# Patient Record
Sex: FEMALE | Race: WHITE | NOT HISPANIC OR LATINO | Employment: FULL TIME | URBAN - METROPOLITAN AREA
[De-identification: names, ages, dates, MRNs, and addresses within clinical notes are randomized per-mention and may not be internally consistent; named-entity substitution may affect disease eponyms.]

---

## 2021-06-01 DIAGNOSIS — B00.9 HSV-2 INFECTION: Primary | ICD-10-CM

## 2021-06-01 RX ORDER — VALACYCLOVIR HYDROCHLORIDE 500 MG/1
500 TABLET, FILM COATED ORAL DAILY
Qty: 90 TABLET | Refills: 4 | Status: SHIPPED | OUTPATIENT
Start: 2021-06-01 | End: 2022-03-31

## 2021-06-01 RX ORDER — VALACYCLOVIR HYDROCHLORIDE 500 MG/1
500 TABLET, FILM COATED ORAL DAILY
COMMUNITY
End: 2021-06-01 | Stop reason: SDUPTHER

## 2021-06-01 NOTE — TELEPHONE ENCOUNTER
Patient called requesting refill of her Valtrex 500 mg which she take daily (suppressive therapy) to [facility]  She was seen by Dr Noe Wagner 02/11/2021 but no rx was transmitted  Advised pt that Dr Noe Wagner is out of the office today but will forward her rx request to on-call provider Dr Trev Horner to address refills as she does need it sent as soon as possible  Dr Trev Horner please address

## 2022-03-24 ENCOUNTER — VBI (OUTPATIENT)
Dept: ADMINISTRATIVE | Facility: OTHER | Age: 52
End: 2022-03-24

## 2022-03-24 NOTE — TELEPHONE ENCOUNTER
Upon review of the In Basket request we were able to locate, review, and update the patient chart as requested for Pap Smear (HPV) aka Cervical Cancer Screening  Any additional questions or concerns should be emailed to the Practice Liaisons via [email or web address] email, please do not reply via In Basket      Thank you  Gabbie Jerez

## 2022-03-31 ENCOUNTER — ANNUAL EXAM (OUTPATIENT)
Dept: OBGYN CLINIC | Facility: CLINIC | Age: 52
End: 2022-03-31
Payer: COMMERCIAL

## 2022-03-31 VITALS
DIASTOLIC BLOOD PRESSURE: 84 MMHG | HEIGHT: 64 IN | SYSTOLIC BLOOD PRESSURE: 144 MMHG | BODY MASS INDEX: 49.34 KG/M2 | WEIGHT: 289 LBS

## 2022-03-31 DIAGNOSIS — N92.6 IRREGULAR MENSES: ICD-10-CM

## 2022-03-31 DIAGNOSIS — Z01.411 ENCNTR FOR GYN EXAM (GENERAL) (ROUTINE) W ABNORMAL FINDINGS: Primary | ICD-10-CM

## 2022-03-31 DIAGNOSIS — Z12.4 SCREENING FOR CERVICAL CANCER: ICD-10-CM

## 2022-03-31 DIAGNOSIS — Z12.31 ENCOUNTER FOR SCREENING MAMMOGRAM FOR MALIGNANT NEOPLASM OF BREAST: ICD-10-CM

## 2022-03-31 DIAGNOSIS — Z87.42 HISTORY OF ABNORMAL CERVICAL PAP SMEAR: ICD-10-CM

## 2022-03-31 DIAGNOSIS — Z11.3 SCREENING FOR STD (SEXUALLY TRANSMITTED DISEASE): ICD-10-CM

## 2022-03-31 PROCEDURE — S0612 ANNUAL GYNECOLOGICAL EXAMINA: HCPCS | Performed by: OBSTETRICS & GYNECOLOGY

## 2022-03-31 NOTE — PROGRESS NOTES
Annual Wellness Visit  [address], [address], [facility], [address] [facility]    ASSESSMENT/PLAN: Robe Peng is a 54 y o  Luiza Hong who presents for annual gynecologic exam     Encounter for routine gynecologic examination  - Routine well woman exam completed today  - Cervical Cancer Screening: Current ASCCP Guidelines reviewed  Last Pap: 02/11/2021  Next Pap Due: 2026, routine   - STI screening offered including HIV testing: GC/CT and blood work ordered  - Contraceptive counseling discussed  Current contraception: no method  - Breast Cancer Screening: Last Mammogram 02/17/2022  - The following were reviewed in today's visit: breast self exam    Additional problems addressed during this visit:  1  Encntr for gyn exam (general) (routine) w abnormal findings  -     IGP,CtNg,AptimaHPV,rfx16/18,45    2  Encounter for screening mammogram for malignant neoplasm of breast  -     Mammo screening bilateral w 3d & cad; Future    3  Screening for cervical cancer  -     IGP, Aptima HPV, Rfx 16/18,45  -     IGP,CtNg,AptimaHPV,rfx16/18,45    4  Irregular menses       -  Si/sx of menopause reviewed with patient  Advised patient to inform Gyn if vaginal bleeding occurs <21 days apart or lasts>7days  Inform Gyn if vaginal bleeding occurs after no menses for 1 year    5  Screening for STD (sexually transmitted disease)  -     Hepatitis B core antibody, total; Future  -     Hepatitis B surface antigen; Future  -     Hepatitis C antibody; Future  -     HIV 1/2 Antigen/Antibody (4th Generation) w Reflex [facility]; Future  -     RPR; Future    6  History of abnormal cervical Pap smear       -  Patient Pap significant for ASCUS, neg HR HPV 2019 and 2021  Next visit:  1 yr    CC:  Annual Gynecologic Examination    HPI: Robe Peng is a 54 y o  Luiza Hong who presents for annual gynecologic examination  Patient presents for Gyn exam   Desires STD testing, including HIV    Has history of Herpes and Chlamydia in the past   Patient states her menstrual cycles were q 4 weeks but now occur q 3 months, desires to discuss si/sx of menopause      Gyn History  Patient's last menstrual period was 2022  Last Pap: 2021 was normal    She  reports being sexually active and has had partner(s) who are male  OB History      Past Medical History:  No date: Chlamydia  No date: Herpes  No date: Morbid obesity ([address] )  No date: Mucous polyp of cervix     Past Surgical History:  : BARIATRIC SURGERY  No date: MAMMO (HISTORICAL)  2014: OTHER SURGICAL HISTORY      Comment:  lap band removed     History reviewed  No pertinent family history  Social History     Tobacco Use    Smoking status: Never Smoker    Smokeless tobacco: Never Used   Vaping Use    Vaping Use: Never used   Substance Use Topics    Alcohol use: Yes    Drug use: Never     Comment: no          Current Outpatient Medications:     valACYclovir (VALTREX) 500 mg tablet, Take 1 tablet (500 mg total) by mouth daily 1 tablet daily, Disp: 90 tablet, Rfl: 4    She has No Known Allergies       ROS negative except as noted in HPI    Objective:  /84 (BP Location: Left arm, Patient Position: Sitting, Cuff Size: Standard)   Ht 5' 4" (1 626 m)   Wt 131 kg (289 lb)   LMP 2022   BMI 49 61 kg/m²      Physical Exam  Vitals and nursing note reviewed  HENT:      Head: Normocephalic  Chest:   Breasts: Breasts are symmetrical       Right: Normal  No bleeding, mass, nipple discharge, skin change, tenderness or axillary adenopathy  Left: Normal  No bleeding, mass, nipple discharge, skin change, tenderness or axillary adenopathy  Abdominal:      General: There is no distension  Palpations: Abdomen is soft  There is no mass  Tenderness: There is no abdominal tenderness  There is no rebound  Genitourinary:     General: Normal vulva  Exam position: Lithotomy position        Labia:         Right: No rash, tenderness or lesion  Left: No rash, tenderness or lesion  Urethra: No urethral pain or urethral lesion  Vagina: Normal  No vaginal discharge  Cervix: No discharge, friability, lesion or erythema  Uterus: Normal        Adnexa: Right adnexa normal and left adnexa normal       Rectum: No external hemorrhoid  Musculoskeletal:      Right lower leg: No edema  Left lower leg: No edema  Lymphadenopathy:      Upper Body:      Right upper body: No axillary or pectoral adenopathy  Left upper body: No axillary or pectoral adenopathy  Skin:     General: Skin is warm  Neurological:      Mental Status: She is alert and oriented to person, place, and time  Psychiatric:         Mood and Affect: Mood normal          Behavior: Behavior normal          Thought Content:  Thought content normal

## 2022-04-06 LAB
C TRACH RRNA CVX QL NAA+PROBE: NEGATIVE
CYTOLOGIST CVX/VAG CYTO: NORMAL
DX ICD CODE: NORMAL
HPV I/H RISK 4 DNA CVX QL PROBE+SIG AMP: NEGATIVE
Lab: NORMAL
N GONORRHOEA RRNA CVX QL NAA+PROBE: NEGATIVE
OTHER STN SPEC: NORMAL
PATH REPORT.FINAL DX SPEC: NORMAL
SL AMB NOTE:: NORMAL
SL AMB SPECIMEN ADEQUACY: NORMAL
SL AMB TEST METHODOLOGY: NORMAL

## 2022-08-31 ENCOUNTER — TELEPHONE (OUTPATIENT)
Dept: OBGYN CLINIC | Facility: CLINIC | Age: 52
End: 2022-08-31

## 2022-08-31 DIAGNOSIS — B00.9 HSV-2 INFECTION: ICD-10-CM

## 2022-08-31 RX ORDER — VALACYCLOVIR HYDROCHLORIDE 500 MG/1
500 TABLET, FILM COATED ORAL DAILY
Qty: 90 TABLET | Refills: 4 | Status: SHIPPED | OUTPATIENT
Start: 2022-08-31 | End: 2022-11-29

## 2022-08-31 NOTE — TELEPHONE ENCOUNTER
Received a call from Stacie Chong at [facility] mail away pharmacy in [address] [place] [address] patient is attempting to transfer her Valtrex Rx but it has  and will need a new order e-prescribed  Spoke with pt, who informed  she has had a recent change in insurance, will no longer be using [facility] and and will  need to use [facility] mail away pharmacy  Pt reports she takes Valtrex daily for suppression therapy  Last WA: 3/31/2022  Order pended, please review and send  - - -

## 2023-06-07 ENCOUNTER — ANNUAL EXAM (OUTPATIENT)
Dept: OBGYN CLINIC | Facility: CLINIC | Age: 53
End: 2023-06-07
Payer: COMMERCIAL

## 2023-06-07 VITALS
SYSTOLIC BLOOD PRESSURE: 130 MMHG | WEIGHT: 281.6 LBS | DIASTOLIC BLOOD PRESSURE: 88 MMHG | HEIGHT: 64 IN | BODY MASS INDEX: 48.07 KG/M2

## 2023-06-07 DIAGNOSIS — Z12.4 SCREENING FOR CERVICAL CANCER: ICD-10-CM

## 2023-06-07 DIAGNOSIS — Z12.31 BREAST CANCER SCREENING BY MAMMOGRAM: ICD-10-CM

## 2023-06-07 DIAGNOSIS — Z01.419 ENCNTR FOR GYN EXAM (GENERAL) (ROUTINE) W/O ABN FINDINGS: Primary | ICD-10-CM

## 2023-06-07 DIAGNOSIS — Z11.3 SCREENING FOR STD (SEXUALLY TRANSMITTED DISEASE): ICD-10-CM

## 2023-06-07 DIAGNOSIS — Z87.42 HISTORY OF ABNORMAL CERVICAL PAP SMEAR: ICD-10-CM

## 2023-06-07 DIAGNOSIS — E66.01 MORBID OBESITY (HCC): ICD-10-CM

## 2023-06-07 PROCEDURE — S0612 ANNUAL GYNECOLOGICAL EXAMINA: HCPCS | Performed by: OBSTETRICS & GYNECOLOGY

## 2023-06-07 RX ORDER — VALACYCLOVIR HYDROCHLORIDE 500 MG/1
500 TABLET, FILM COATED ORAL
COMMUNITY

## 2023-06-07 RX ORDER — AMLODIPINE BESYLATE 5 MG/1
5 TABLET ORAL DAILY
COMMUNITY
Start: 2023-05-02

## 2023-06-07 NOTE — PROGRESS NOTES
Annual Wellness Visit  [address]   [address], [address], [facility], [facility]    ASSESSMENT/PLAN: Baltazar Clayton is a 50 y o  Robin Cartagena who presents for annual gynecologic exam     Encounter for routine gynecologic examination  - Routine well woman exam completed today  - Cervical Cancer Screening: Current ASCCP Guidelines reviewed  Last Pap: 2022  Next Pap Due: today, routine   - Contraceptive counseling discussed  Current contraception: no method  - Breast Cancer Screening: Last Mammogram 2022  - The following were reviewed in today's visit: breast self exam    Additional problems addressed during this visit:  1  Encntr for gyn exam (general) (routine) w/o abn findings  -     IGP,CtNg,AptimaHPV,rfx16/18,45    2  Breast cancer screening by mammogram  -     Mammo screening bilateral w 3d & cad; Future    3  Screening for cervical cancer  -     IGP,CtNg,AptimaHPV,rfx16/18,45    4  Screening for STD (sexually transmitted disease)  -     Hepatitis C antibody; Future  -     Hepatitis B core antibody, total; Future  -     Hepatitis C antibody  -     Hepatitis B core antibody, total  -     HIV 1/2 AG/AB W REFLEX [facility] only; Future  -     HIV 1/2 AG/AB W REFLEX [facility] only    5  History of abnormal cervical Pap smear    6  Morbid obesity ([address] )        Next visit: 1 yr      CC:  Annual Gynecologic Examination    HPI: Baltazar Clayton is a 50 y o  Robin Cartagena who presents for annual gynecologic examination  Patient presents for Gyn exam   Desires STD screening with CG/CT and lab order      Gyn History  Patient's last menstrual period was 2023  Last Pap: 2022 was normal    She  reports being sexually active and has had partner(s) who are male  She reports using the following method of birth control/protection: None  OB History      Past Medical History:  No date: Chlamydia  No date: Herpes  No date: Hypertension  No date:  Morbid obesity "(HCC)  No date: Mucous polyp of cervix     Past Surgical History:  2010: BARIATRIC SURGERY  No date: MAMMO (HISTORICAL)  2014: OTHER SURGICAL HISTORY      Comment:  lap band removed     Family History   Problem Relation Age of Onset   • Cancer Mother    • Breast cancer Neg Hx    • Uterine cancer Neg Hx    • Ovarian cancer Neg Hx    • Colon cancer Neg Hx         Social History     Tobacco Use   • Smoking status: Never   • Smokeless tobacco: Never   Vaping Use   • Vaping Use: Never used   Substance Use Topics   • Alcohol use: Yes     Alcohol/week: 6 0 standard drinks of alcohol     Types: 6 Shots of liquor per week     Comment: social   • Drug use: Never     Comment: no          Current Outpatient Medications:   •  amLODIPine (NORVASC) 5 mg tablet, Take 5 mg by mouth daily, Disp: , Rfl:   •  valACYclovir (VALTREX) 500 mg tablet, Take 500 mg by mouth Daily at 2am, Disp: , Rfl:   •  valACYclovir (VALTREX) 500 mg tablet, Take 1 tablet (500 mg total) by mouth daily 1 tablet daily, Disp: 90 tablet, Rfl: 4    She has No Known Allergies       ROS negative except as noted in HPI    Objective:  /88   Ht 5' 3 5\" (1 613 m)   Wt 128 kg (281 lb 9 6 oz)   LMP 04/24/2023 Comment: Sporadic menses  BMI 49 10 kg/m²      Physical Exam  Vitals and nursing note reviewed  HENT:      Head: Normocephalic  Chest:   Breasts:     Breasts are symmetrical       Right: Normal  No bleeding, mass, nipple discharge, skin change or tenderness  Left: Normal  No bleeding, mass, nipple discharge, skin change or tenderness  Abdominal:      General: There is no distension  Palpations: Abdomen is soft  There is no mass  Tenderness: There is no abdominal tenderness  There is no rebound  Genitourinary:     General: Normal vulva  Exam position: Lithotomy position  Labia:         Right: No rash, tenderness or lesion  Left: No rash, tenderness or lesion  Urethra: No urethral pain or urethral lesion        " Vagina: Normal  No vaginal discharge  Cervix: No discharge, friability, lesion or erythema  Uterus: Normal        Adnexa: Right adnexa normal and left adnexa normal       Rectum: No external hemorrhoid  Musculoskeletal:      Right lower leg: No edema  Left lower leg: No edema  Lymphadenopathy:      Upper Body:      Right upper body: No axillary or pectoral adenopathy  Left upper body: No axillary or pectoral adenopathy  Skin:     General: Skin is warm  Neurological:      Mental Status: She is alert and oriented to person, place, and time  Psychiatric:         Mood and Affect: Mood normal          Behavior: Behavior normal          Thought Content:  Thought content normal

## 2023-06-10 LAB
C TRACH RRNA CVX QL NAA+PROBE: NEGATIVE
CYTOLOGIST CVX/VAG CYTO: NORMAL
DX ICD CODE: NORMAL
HPV GENOTYPE REFLEX: NORMAL
HPV I/H RISK 4 DNA CVX QL PROBE+SIG AMP: NEGATIVE
N GONORRHOEA RRNA CVX QL NAA+PROBE: NEGATIVE
OTHER STN SPEC: NORMAL
PATH REPORT.FINAL DX SPEC: NORMAL
SL AMB NOTE:: NORMAL
SL AMB SPECIMEN ADEQUACY: NORMAL
SL AMB TEST METHODOLOGY: NORMAL

## 2023-10-20 DIAGNOSIS — B00.9 HSV-2 INFECTION: ICD-10-CM

## 2023-10-22 RX ORDER — VALACYCLOVIR HYDROCHLORIDE 500 MG/1
500 TABLET, FILM COATED ORAL DAILY
Qty: 90 TABLET | Refills: 3 | Status: SHIPPED | OUTPATIENT
Start: 2023-10-22 | End: 2024-10-16

## 2024-10-31 ENCOUNTER — ANNUAL EXAM (OUTPATIENT)
Dept: OBGYN CLINIC | Facility: CLINIC | Age: 54
End: 2024-10-31
Payer: COMMERCIAL

## 2024-10-31 VITALS — WEIGHT: 293 LBS | HEIGHT: 64 IN | BODY MASS INDEX: 50.02 KG/M2

## 2024-10-31 DIAGNOSIS — N95.9 POSTMENOPAUSAL SYMPTOMS: ICD-10-CM

## 2024-10-31 DIAGNOSIS — Z01.411 ENCNTR FOR GYN EXAM (GENERAL) (ROUTINE) W ABNORMAL FINDINGS: Primary | ICD-10-CM

## 2024-10-31 DIAGNOSIS — Z12.31 BREAST CANCER SCREENING BY MAMMOGRAM: ICD-10-CM

## 2024-10-31 DIAGNOSIS — G47.00 INSOMNIA, UNSPECIFIED TYPE: ICD-10-CM

## 2024-10-31 DIAGNOSIS — Z11.3 SCREEN FOR STD (SEXUALLY TRANSMITTED DISEASE): ICD-10-CM

## 2024-10-31 PROCEDURE — S0612 ANNUAL GYNECOLOGICAL EXAMINA: HCPCS | Performed by: OBSTETRICS & GYNECOLOGY

## 2024-10-31 PROCEDURE — 99214 OFFICE O/P EST MOD 30 MIN: CPT | Performed by: OBSTETRICS & GYNECOLOGY

## 2024-10-31 RX ORDER — OLMESARTAN MEDOXOMIL 5 MG/1
TABLET ORAL
COMMUNITY
Start: 2024-01-01

## 2024-11-05 LAB
C TRACH RRNA SPEC QL NAA+PROBE: NEGATIVE
N GONORRHOEA RRNA SPEC QL NAA+PROBE: NEGATIVE

## 2024-11-06 DIAGNOSIS — B00.9 HSV-2 INFECTION: Primary | ICD-10-CM

## 2024-11-06 RX ORDER — VALACYCLOVIR HYDROCHLORIDE 500 MG/1
500 TABLET, FILM COATED ORAL DAILY
Qty: 90 TABLET | Refills: 3 | Status: SHIPPED | OUTPATIENT
Start: 2024-11-06 | End: 2025-11-01

## 2024-12-09 NOTE — PROGRESS NOTES
"Annual Wellness Visit  [facility] - [address], [address], [place] [address]    ASSESSMENT/PLAN: Cinthia Ortiz is a 54 y.o.  who presents for annual gynecologic exam.    Assessment & Plan  Breast cancer screening by mammogram    Orders:    Mammo screening bilateral w 3d and cad; Future    Screen for STD (sexually transmitted disease)    Orders:    Chlamydia/GC EDIN, Confirmation    Encntr for gyn exam (general) (routine) w abnormal findings         Postmenopausal symptoms     -  Si/sx of menopause reviewed with patient.  Management with HRT or SSRI reviewed, including R&B and indications      -  pt declines medical treatment options        Insomnia, unspecified type       -  management with SSRI, intermittent use of Benadryl reviewed.  Encouraged patient to discuss further with PCP       BMI 50.0-59.9, adult (HCC)      -  Healthy diet, exercise reviewed.     I have spent a total time of 50 minutes (30 minutes were spent addressing concerns not related to \"Gyn wellness\" visit) in caring for this patient on the day of the visit/encounter including Diagnostic results, Prognosis, Risks and benefits of tx options, Instructions for management, Patient and family education, Importance of tx compliance, Risk factor reductions, Impressions, Counseling / Coordination of care, Documenting in the medical record, Reviewing / ordering tests, medicine, procedures  , and Obtaining or reviewing history  .     Next visit: 1 yr [place]      CC:  Annual Gynecologic Examination    HPI: Cinthia Ortiz is a 54 y.o.  who presents for annual gynecologic examination.  Patient presents for Gyn wellness exam.  Also desires to discuss symptoms related to monopause.  Desires to be screened for GC/CT.  Patient also concerned about insomnia    Gynecologic Exam        Gyn History  Patient's last menstrual period was 2023.     Last Pap: 2023 was normal    She  reports being sexually active and has had " "partner(s) who are male. She reports using the following method of birth control/protection: None.       OB History      Past Medical History:  No date: Chlamydia  No date: Herpes  No date: Hypertension  No date: Morbid obesity (HCC)  No date: Mucous polyp of cervix     Past Surgical History:  : BARIATRIC SURGERY  No date: MAMMO (HISTORICAL)  2014: OTHER SURGICAL HISTORY      Comment:  lap band removed     Family History   Problem Relation Age of Onset    Cancer Mother     Breast cancer Neg Hx     Uterine cancer Neg Hx     Ovarian cancer Neg Hx     Colon cancer Neg Hx         Social History     Tobacco Use    Smoking status: Never     Passive exposure: Never    Smokeless tobacco: Never   Vaping Use    Vaping status: Never Used   Substance Use Topics    Alcohol use: Yes     Alcohol/week: 6.0 standard drinks of alcohol     Types: 6 Shots of liquor per week     Comment: social    Drug use: Never     Comment: no          Current Outpatient Medications:     amLODIPine (NORVASC) 5 mg tablet, Take 5 mg by mouth daily, Disp: , Rfl:     olmesartan (BENICAR) 5 mg tablet, , Disp: , Rfl:     valACYclovir (VALTREX) 500 mg tablet, Take 1 tablet (500 mg total) by mouth daily, Disp: 90 tablet, Rfl: 3    valACYclovir (VALTREX) 500 mg tablet, Take 1 tablet (500 mg total) by mouth daily, Disp: 90 tablet, Rfl: 3    valACYclovir (VALTREX) 500 mg tablet, Take 500 mg by mouth Daily at 2am, Disp: , Rfl:     She has no known allergies..    ROS negative except as noted in HPI    Objective:  Ht 5' 4\" (1.626 m)   Wt 133 kg (294 lb)   LMP 2023 Comment: Sporadic menses  BMI 50.46 kg/m²      Physical Exam  Vitals and nursing note reviewed.   HENT:      Head: Normocephalic.   Chest:   Breasts:     Breasts are symmetrical.      Right: Normal. No bleeding, mass, nipple discharge, skin change or tenderness.      Left: Normal. No bleeding, mass, nipple discharge, skin change or tenderness.   Abdominal:      General: There is no " distension.      Palpations: Abdomen is soft. There is no mass.      Tenderness: There is no abdominal tenderness. There is no rebound.   Genitourinary:     General: Normal vulva.      Exam position: Lithotomy position.      Labia:         Right: No rash, tenderness or lesion.         Left: No rash, tenderness or lesion.       Urethra: No urethral pain or urethral lesion.      Vagina: Normal. No vaginal discharge.      Cervix: No discharge, friability, lesion or erythema.      Uterus: Normal.       Adnexa: Right adnexa normal and left adnexa normal.      Rectum: No external hemorrhoid.   Musculoskeletal:      Right lower leg: No edema.      Left lower leg: No edema.   Lymphadenopathy:      Upper Body:      Right upper body: No axillary or pectoral adenopathy.      Left upper body: No axillary or pectoral adenopathy.   Skin:     General: Skin is warm.   Neurological:      Mental Status: She is alert and oriented to person, place, and time.   Psychiatric:         Mood and Affect: Mood normal.         Behavior: Behavior normal.         Thought Content: Thought content normal.